# Patient Record
(demographics unavailable — no encounter records)

---

## 2025-04-18 NOTE — HISTORY OF PRESENT ILLNESS
[FreeTextEntry1] : Patient relays a history of approximately 2 to 4 weeks of pain to the plantar aspect of the left great toe, no history of injury or trauma, no past treatments and the etiology is either verruca or foreign body within the differential

## 2025-04-18 NOTE — PHYSICAL EXAM
[General Appearance - Alert] : alert [General Appearance - In No Acute Distress] : in no acute distress [FreeTextEntry3] : Vascular exam reveals palpable pedal pulses, the foot is warm to touch, there was good capillary fill time, the skin is normal in appearance there is no evidence of vascular disease or compromise at this time [No Joint Swelling] : no joint swelling [] : normal strength/tone [Normal Foot/Ankle] : Both lower extremities were exposed and visualized. Standing exam demonstrates normal foot posture and alignment. Hindfoot exam shows no hindfoot valgus or varus [FreeTextEntry1] :  Gross sensorium is intact, patient denies numbness tingling and sharp shooting pains [Oriented To Time, Place, And Person] : oriented to person, place, and time

## 2025-04-18 NOTE — PROCEDURE
[FreeTextEntry1] : Based on my physical examination and my clinical findings and the patient's description of the symptoms, a complete differential diagnosis was reviewed with the patient. Possible diagnoses as well as treatment options explained in great detail. All questions asked and answered appropriately. After a lengthy discussion with the patient regarding the need to explore and remove a possible foreign body and consent in front of my office staff the patient had copious amounts of topical lidocaine applied to the area. Once sufficient anesthesia was achieved and utilizing sharp dissection excision of a small foreign body was removed. Next, a dry sterile dressing as well as discharge orders and wound care instructions reviewed with the patient all questions asked and answered appropriately. follow up appt 2 weeks if sx persist